# Patient Record
Sex: FEMALE | Race: OTHER | Employment: FULL TIME
[De-identification: names, ages, dates, MRNs, and addresses within clinical notes are randomized per-mention and may not be internally consistent; named-entity substitution may affect disease eponyms.]

---

## 2024-10-04 ENCOUNTER — NURSE TRIAGE (OUTPATIENT)
Dept: OTHER | Facility: CLINIC | Age: 49
End: 2024-10-04

## 2024-10-04 NOTE — TELEPHONE ENCOUNTER
Location of patient: [place]    Location of employment: [employer]    Department where injury occurred:[address]    Location of injury (body part involved):   Left knee  Time of injury: 8/15/2024 1400    Last 4 of SSN: [id number]    Location associate referred to for care:    Subjective: Caller states on August 15th was pivoting pt. From [employer] to bed and twisted left knee, didn't think anything of it, 3-4 days later knee swollen, went to see PCP was advised she had Bakers Cyst,went to PT,knee feeling worse last week, had MRI done, shows complete tear in meniscus, she believes is from injury on 8/15/24  Seen in ER 10/1/24,fluid drained from knee,  States she has more fluid on her knee now then when she went to ER.     Triage not indicated, duplicate triage, was advised by ER to return for worsening sx on 10/1/24    Employee injury packet sent to employee with listing of approved providers and locations. Employee aware they must be seen by one of the panel physicians, not their own PCP. Employee verbalizes understanding.      Care advice provided, caller verbalizes understanding; denies any other questions or concerns.    Outcome-pt. States she plans to return to ER.     Reason for Disposition   Caller has already spoken with another triager or PCP (or office), and has further questions and triager able to answer questions.    Protocols used: No Contact or Duplicate Contact Call-ADULT-[place]

## 2024-12-26 ENCOUNTER — APPOINTMENT (OUTPATIENT)
Facility: HOSPITAL | Age: 49
End: 2024-12-26
Payer: MEDICAID

## 2024-12-26 ENCOUNTER — HOSPITAL ENCOUNTER (EMERGENCY)
Facility: HOSPITAL | Age: 49
Discharge: HOME | End: 2024-12-26
Payer: MEDICAID

## 2024-12-26 VITALS
HEART RATE: 80 BPM | OXYGEN SATURATION: 97 % | HEIGHT: 64 IN | WEIGHT: 180 LBS | DIASTOLIC BLOOD PRESSURE: 82 MMHG | TEMPERATURE: 98.4 F | RESPIRATION RATE: 18 BRPM | SYSTOLIC BLOOD PRESSURE: 122 MMHG | BODY MASS INDEX: 30.73 KG/M2

## 2024-12-26 DIAGNOSIS — J11.1 INFLUENZA WITH RESPIRATORY MANIFESTATION OTHER THAN PNEUMONIA: Primary | ICD-10-CM

## 2024-12-26 LAB
FLUAV RNA SPEC QL NAA+PROBE: DETECTED
FLUBV RNA SPEC QL NAA+PROBE: NOT DETECTED
SARS-COV-2 RNA RESP QL NAA+PROBE: NOT DETECTED
SOURCE: ABNORMAL

## 2024-12-26 PROCEDURE — 94761 N-INVAS EAR/PLS OXIMETRY MLT: CPT

## 2024-12-26 PROCEDURE — 99284 EMERGENCY DEPT VISIT MOD MDM: CPT

## 2024-12-26 PROCEDURE — 71046 X-RAY EXAM CHEST 2 VIEWS: CPT

## 2024-12-26 PROCEDURE — 87636 SARSCOV2 & INF A&B AMP PRB: CPT

## 2024-12-26 RX ORDER — OSELTAMIVIR PHOSPHATE 75 MG/1
75 CAPSULE ORAL DAILY
Qty: 7 CAPSULE | Refills: 0 | Status: SHIPPED | OUTPATIENT
Start: 2024-12-26 | End: 2025-01-02

## 2024-12-26 ASSESSMENT — PAIN DESCRIPTION - LOCATION: LOCATION: BACK

## 2024-12-26 ASSESSMENT — ENCOUNTER SYMPTOMS
COUGH: 1
GASTROINTESTINAL NEGATIVE: 1
EYES NEGATIVE: 1

## 2024-12-26 ASSESSMENT — PAIN SCALES - GENERAL: PAINLEVEL_OUTOF10: 3

## 2024-12-26 ASSESSMENT — PAIN - FUNCTIONAL ASSESSMENT: PAIN_FUNCTIONAL_ASSESSMENT: 0-10

## 2024-12-26 NOTE — ED PROVIDER NOTES
[facility] EMERGENCY DEPT  EMERGENCY DEPARTMENT ENCOUNTER      Pt Name: Loc Bowman  MRN: [id number]  Birthdate 1975  Date of evaluation: 12/26/2024  Provider: ARLENE Hyde  10:30 AM    CHIEF COMPLAINT       Chief Complaint   Patient presents with    Shortness of Breath         HISTORY OF PRESENT ILLNESS    Loc Bowman is a 49 y.o. female who presents to the emergency department with a complaint of feeling like she is may be developing pneumonia.  Has a history of RA is on immunocompromise medications and has been feeling bad for about a week.  Denies wheezing.  Denies possibility of pregnancy.    HPI    Nursing Notes were reviewed.    REVIEW OF SYSTEMS       Review of Systems   Constitutional:  Positive for fatigue and fever.   HENT:  Positive for congestion.    Eyes: Negative.    Respiratory:  Positive for cough.    Cardiovascular: Negative.    Gastrointestinal: Negative.    Endocrine: Negative.    Genitourinary: Negative.    Musculoskeletal:  Positive for myalgias.   Skin: Negative.    Allergic/Immunologic: Positive for immunocompromised state.   Neurological: Negative.    Hematological: Negative.    Psychiatric/Behavioral: Negative.         Except as noted above the remainder of the review of systems was reviewed and negative.       PAST MEDICAL HISTORY     Past Medical History:   Diagnosis Date    Arthritis     psoriatic    Hypokalemia     Narcolepsy     with Cataplexy         SURGICAL HISTORY       Past Surgical History:   Procedure Laterality Date    BREAST BIOPSY Right 10/21/2015    RIGHT NIPPLE DUCT EXPLORATION AND EXCISIONAL BIOPSY performed by Finn Barry MD at [facility] MAIN OR    COLONOSCOPY N/A 11/19/2020    SCREENING COLONOSCOPY performed by René Ramirez MD at [facility] ENDOSCOPY    NIPPLE EXPLORATION Right 10/21/15    Dr. Barry         CURRENT MEDICATIONS       Previous Medications    ADALIMUMAB (HUMIRA) 40 MG/0.8ML INJECTION    Inject 40 mg into the skin every 14 days    ALBUTEROL SULFATE HFA

## 2024-12-26 NOTE — ED TRIAGE NOTES
Pt states had for the flu a week ago and now thinks she has pneumonia. Pt states SOB x 3 days, pt took Dayquil x 1 hr